# Patient Record
Sex: FEMALE
[De-identification: names, ages, dates, MRNs, and addresses within clinical notes are randomized per-mention and may not be internally consistent; named-entity substitution may affect disease eponyms.]

---

## 2024-02-02 ENCOUNTER — APPOINTMENT (OUTPATIENT)
Dept: OTOLARYNGOLOGY | Facility: CLINIC | Age: 44
End: 2024-02-02
Payer: COMMERCIAL

## 2024-02-02 VITALS — WEIGHT: 240 LBS | BODY MASS INDEX: 36.37 KG/M2 | HEIGHT: 68 IN

## 2024-02-02 DIAGNOSIS — H72.93 UNSPECIFIED PERFORATION OF TYMPANIC MEMBRANE, BILATERAL: ICD-10-CM

## 2024-02-02 PROBLEM — Z00.00 ENCOUNTER FOR PREVENTIVE HEALTH EXAMINATION: Status: ACTIVE | Noted: 2024-02-02

## 2024-02-02 PROCEDURE — 99204 OFFICE O/P NEW MOD 45 MIN: CPT

## 2024-02-02 PROCEDURE — 92557 COMPREHENSIVE HEARING TEST: CPT

## 2024-02-02 PROCEDURE — 92567 TYMPANOMETRY: CPT

## 2024-02-02 PROCEDURE — 99203 OFFICE O/P NEW LOW 30 MIN: CPT

## 2024-02-02 NOTE — CONSULT LETTER
[Dear  ___] : Dear  [unfilled], [Courtesy Letter:] : I had the pleasure of seeing your patient, [unfilled], in my office today. [Consult Closing:] : Thank you very much for allowing me to participate in the care of this patient.  If you have any questions, please do not hesitate to contact me. [Sincerely,] : Sincerely, [FreeTextEntry3] : Vignesh Klein MD Department of Otolaryngology - Head and Neck Surgery

## 2024-02-02 NOTE — ASSESSMENT
[FreeTextEntry1] : 43F here for initial evaluation. She reports long standing history of ear related issues with multiple prior PE tubes and left sided tympanoplasty ~20yrs ago. Currently, she has mild decreased left sided hearing which does not really impact her life much at all. There is no ear drainage nor frequent use of ear drops for infection and overall her sx are very much chronic and unchanged. She recently visited ENT who mentioned she has a large hole in her left eardrum and that she needed surgery. At that time, cerumen was removed from the right ear and an extruded ear tube came out as well. She is here for second opinion. Audiogram from today shows essentially normal right sided hearing sloping to mild HF SNHL; there is a moderately severe to mid mixed left sided HL sloping to moderate HF SNHL. Speech discrimination scores are 100% in both ears and there are bilateral type B tympanograms with high ECV. On exam, there is a small, dry, right anterior TM perforation. There is a large, dry, left TM perforation w sclerotic edges and thickened TM remnant. 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 The left is much larger and there is a sizable conductive hearing loss component, but this is unchanged as per pt and long standing. Ears are always dry and otherwise have not impacted quality of her life much at all. Whereas left tympanoplasty may close the air-bone gap and improve hearing, this is not wholly necessary - pt is fine w hearing and the ear is already safe and dry and she does not swim. As for the right TM perforation - hearing is normal and ear is dry, so nothing to do. For now, no acute ENT intervention - would watch and wait. RTO 6-12 mo for ear exam/cleaning. All questions answered,

## 2024-02-02 NOTE — HISTORY OF PRESENT ILLNESS
[de-identified] : 43F here for initial evaluation.  She reports long standing history of ear related issues with multiple prior PE tubes and left sided tympanoplasty ~20yrs ago. Currently, she has mild decreased left sided hearing which does not really impact her life much. There is no ear drainage or frequent use of ear drops for infection. She recently visited ENT who mentioned she has a large hole in her left eardrum and said she needed surgery. At that time, cerumen was removed from the right ear and an extruded ear tube came out as well. She is here for second opinion.  Audiogram from today: R ear: hearing essentially normal sloping to mild HF SNHL. SRT 0, 100%, type B, high ECV L ear: moderately severe to mid mixed HL sloping to moderate HF SNHL. SRT 30, 100%, type B high ECV  ROS otherwise unremarkable.

## 2024-02-02 NOTE — PHYSICAL EXAM
[FreeTextEntry1] : Ad: EAC clear, TM w small anterior perforation, dry, ME clear As: EAC clear, large perforation w sclerotic edges and thickened TM remnant, ME dry, no keratin [Midline] : trachea located in midline position [Normal] : no rashes

## 2025-02-07 ENCOUNTER — APPOINTMENT (OUTPATIENT)
Dept: OTOLARYNGOLOGY | Facility: CLINIC | Age: 45
End: 2025-02-07